# Patient Record
Sex: MALE | Race: WHITE | NOT HISPANIC OR LATINO | URBAN - METROPOLITAN AREA
[De-identification: names, ages, dates, MRNs, and addresses within clinical notes are randomized per-mention and may not be internally consistent; named-entity substitution may affect disease eponyms.]

---

## 2019-08-14 ENCOUNTER — EMERGENCY (EMERGENCY)
Facility: HOSPITAL | Age: 69
LOS: 0 days | Discharge: HOME | End: 2019-08-14
Attending: HOSPITALIST
Payer: MEDICARE

## 2019-08-14 VITALS — TEMPERATURE: 98 F | SYSTOLIC BLOOD PRESSURE: 122 MMHG | HEART RATE: 85 BPM | DIASTOLIC BLOOD PRESSURE: 92 MMHG

## 2019-08-14 VITALS — WEIGHT: 229.94 LBS

## 2019-08-14 DIAGNOSIS — M76.811 ANTERIOR TIBIAL SYNDROME, RIGHT LEG: ICD-10-CM

## 2019-08-14 DIAGNOSIS — M79.671 PAIN IN RIGHT FOOT: ICD-10-CM

## 2019-08-14 PROCEDURE — 99283 EMERGENCY DEPT VISIT LOW MDM: CPT

## 2019-08-14 PROCEDURE — 73620 X-RAY EXAM OF FOOT: CPT | Mod: 26,RT

## 2019-08-14 PROCEDURE — 99284 EMERGENCY DEPT VISIT MOD MDM: CPT | Mod: 25

## 2019-08-14 PROCEDURE — 73620 X-RAY EXAM OF FOOT: CPT | Mod: RT

## 2019-08-14 NOTE — ED ADULT NURSE NOTE - OBJECTIVE STATEMENT
pt c/o sudden unset of right foot pain no injury. no bruise or redness noticed on foot. pt would like to have MRI.

## 2019-08-14 NOTE — ED ADULT NURSE NOTE - CHPI ED NUR SYMPTOMS NEG
no tingling/no fever/no nausea/no chills/no decreased eating/drinking/no dizziness/no vomiting/no weakness

## 2019-08-14 NOTE — ED STATDOCS - PSH
No significant past surgical history Ipledge Number (Optional): [id number] Ipledge Number (Optional): [id number]

## 2019-08-14 NOTE — ED ADULT TRIAGE NOTE - CHIEF COMPLAINT QUOTE
pt c/o right foot pain that started spontaneously 1 week ago. pt states something msut be broken. pt denies fall trauma injury

## 2019-08-14 NOTE — ED STATDOCS - NS_ ATTENDINGSCRIBEDETAILS _ED_A_ED_FT
Yin Redmond MD: The history, relevant review of systems, past medical and surgical history, medical decision making, and physical examination was documented by the scribe in my presence and I attest to the accuracy of the documentation.

## 2019-08-14 NOTE — ED STATDOCS - OBJECTIVE STATEMENT
69 y/o male with no pertinent PMHx presents to the ED c/o right foot pain x2 weeks. Pt says he was walking in [facility] when he suddenly felt a sharp pain. Denies recent injury or trauma. States the pain has been intermittent since then. Pain is exacerbated with movement and palpation. Rides a bicycle often. Pt is here on vacation from [place]. No pain medications taken PTA. Has been told his cholesterol is high but refuses to take medications. NKDA.

## 2019-08-14 NOTE — ED STATDOCS - MUSCULOSKELETAL, MLM
range of motion is not limited +TTP over dorsum of right foot, no limitations to movement, distal pulses 2+, good cap refill

## 2023-07-22 NOTE — ED ADULT NURSE NOTE - NS ED NURSE LEVEL OF CONSCIOUSNESS MENTAL STATUS
Neurology Progress Note    S: Patient seen and examined in ICU. in chair. doing well. no pain     Medication:  acetaminophen     Tablet .. 650 milliGRAM(s) Oral every 6 hours  acetaminophen     Tablet .. 650 milliGRAM(s) Oral every 6 hours PRN  amLODIPine   Tablet 5 milliGRAM(s) Oral daily  ascorbic acid 500 milliGRAM(s) Oral two times a day  aspirin enteric coated 81 milliGRAM(s) Oral daily  atorvastatin 80 milliGRAM(s) Oral at bedtime  bisacodyl Suppository 10 milliGRAM(s) Rectal once  chlorhexidine 2% Cloths 1 Application(s) Topical daily  enoxaparin Injectable 40 milliGRAM(s) SubCutaneous every 24 hours  furosemide    Tablet 20 milliGRAM(s) Oral daily  insulin regular Infusion 3 Unit(s)/Hr IV Continuous <Continuous>  metoclopramide Injectable 10 milliGRAM(s) IV Push every 8 hours  metoprolol tartrate 25 milliGRAM(s) Oral every 8 hours  mupirocin 2% Ointment 1 Application(s) Both Nostrils two times a day  oxyCODONE    IR 5 milliGRAM(s) Oral every 4 hours PRN  oxyCODONE    IR 10 milliGRAM(s) Oral every 4 hours PRN  pantoprazole    Tablet 40 milliGRAM(s) Oral before breakfast  polyethylene glycol 3350 17 Gram(s) Oral daily  senna 2 Tablet(s) Oral at bedtime  sodium chloride 0.9%. 1000 milliLiter(s) IV Continuous <Continuous>      Vitals:  Vital Signs Last 24 Hrs  T(C): 37.3 (22 Jul 2023 04:00), Max: 38 (21 Jul 2023 16:00)  T(F): 99.1 (22 Jul 2023 04:00), Max: 100.4 (21 Jul 2023 16:00)  HR: 85 (22 Jul 2023 07:00) (81 - 93)  BP: 125/72 (22 Jul 2023 07:00) (120/70 - 149/72)  BP(mean): 93 (22 Jul 2023 07:00) (91 - 102)  RR: 16 (22 Jul 2023 07:00) (10 - 26)  SpO2: 100% (22 Jul 2023 07:00) (93% - 100%)    Parameters below as of 22 Jul 2023 04:00  Patient On (Oxygen Delivery Method): nasal cannula  O2 Flow (L/min): 6    General Exam:   General Appearance: Appropriately dressed and in no acute distress       Head: Normocephalic, atraumatic and no dysmorphic features  Ear, Nose, and Throat: Moist mucous membranes  CVS: S1S2+  Resp: No SOB, no wheeze or rhonchi  GI: soft NT/ND  Extremities: No edema or cyanosis  Skin: No bruises or rashes     Neurological Exam:  Mental Status: Awake, alert and oriented x 2.  Able to follow simple verbal commands. Able to name and repeat. fluent speech. No obvious aphasia minimal dysarthria noted.   Cranial Nerves: PERRL, EOMI, VFFC, sensation V1-V3 intact,  no obvious facial asymmetry, equal elevation of palate, scm/trap 5/5, tongue is midline on protrusion. no obvious papilledema on fundoscopic exam. hearing is grossly intact.   Motor: Normal bulk, tone and strength throughout. Fine finger movements were intact and symmetric. no tremors or drift noted.    Sensation: Intact to light touch and pinprick throughout. no right/left confusion. no extinction to tactile on DSS.    Reflexes: 1+ throughout at biceps, brachioradialis, triceps, patellars and ankles bilaterally and equal. No clonus. R toe and L toe were both downgoing.  Coordination: No dysmetria on FNF   Gait: walked on unit yestesrday    I personally reviewed the below data/images/labs:      CBC Full  -  ( 22 Jul 2023 00:56 )  WBC Count : 9.52 K/uL  RBC Count : 3.29 M/uL  Hemoglobin : 8.9 g/dL  Hematocrit : 27.9 %  Platelet Count - Automated : 141 K/uL  Mean Cell Volume : 84.8 fl  Mean Cell Hemoglobin : 27.1 pg  Mean Cell Hemoglobin Concentration : 31.9 gm/dL  Auto Neutrophil # : 6.79 K/uL  Auto Lymphocyte # : 1.81 K/uL  Auto Monocyte # : 0.81 K/uL  Auto Eosinophil # : 0.03 K/uL  Auto Basophil # : 0.03 K/uL  Auto Neutrophil % : 71.4 %  Auto Lymphocyte % : 19.0 %  Auto Monocyte % : 8.5 %  Auto Eosinophil % : 0.3 %  Auto Basophil % : 0.3 %    07-22    142  |  109<H>  |  24<H>  ----------------------------<  108<H>  3.5   |  23  |  0.92    Ca    8.5      22 Jul 2023 00:56  Phos  1.9     07-22  Mg     2.2     07-22    TPro  5.9<L>  /  Alb  3.2<L>  /  TBili  0.9  /  DBili  x   /  AST  44<H>  /  ALT  46<H>  /  AlkPhos  62  07-22    LIVER FUNCTIONS - ( 22 Jul 2023 00:56 )  Alb: 3.2 g/dL / Pro: 5.9 g/dL / ALK PHOS: 62 U/L / ALT: 46 U/L / AST: 44 U/L / GGT: x             Urinalysis Basic - ( 22 Jul 2023 00:56 )    Color: x / Appearance: x / SG: x / pH: x  Gluc: 108 mg/dL / Ketone: x  / Bili: x / Urobili: x   Blood: x / Protein: x / Nitrite: x   Leuk Esterase: x / RBC: x / WBC x   Sq Epi: x / Non Sq Epi: x / Bacteria: x        < from: [facility] Duplex Carotid, Bilat (07.18.23 @ 10:17) >    ACC: [id number] EXAM:  CAROTID DUPLEX BILATERAL   ORDERED BY: MANSI WILEY     PROCEDURE DATE:  07/18/2023          INTERPRETATION:  CLINICAL INFORMATION: Coronary artery disease, evaluate   for carotid artery disease    COMPARISON: None available.    TECHNIQUE: Grayscale, color and spectral Doppler examination of both   carotid arteries was performed.    FINDINGS:    There is increased tortuosity to the right and the left carotid arteries   in the neck.    Mild atheromatous plaque is present along the course of the carotid   arteries in the neck.    No elevated velocities or abnormal waveforms are encountered.    Peak systolic velocities are as follows:    RIGHT:  PROX CCA = 110  DIST CCA = 55  PROX ICA = 71  DIST ICA = 58  ECA = 115    LEFT:  PROX CCA = 95  DIST CCA = 62  PROX ICA = 79  DIST ICA = 69  ECA = 77    Antegrade flow is noted within both vertebral arteries.    IMPRESSION: No significant hemodynamic stenosis of either carotid artery.    Measurement of carotid stenosis is based on velocity parameters that   correlate the residual internal carotid diameter with that of the more   distal vessel in accordance with a method such as the [place] [employer]   Symptomatic Carotid Endarterectomy Trial (NASCET).    --- End of Report ---            ISAIAS CHIU MD; Attending Radiologist  This document has been electronically signed. Jul 18 2023 10:46AM    < end of copied text >         Awake

## 2024-09-20 ENCOUNTER — EMERGENCY (EMERGENCY)
Facility: HOSPITAL | Age: 74
LOS: 0 days | Discharge: HOME | End: 2024-09-20
Attending: STUDENT IN AN ORGANIZED HEALTH CARE EDUCATION/TRAINING PROGRAM
Payer: MEDICARE

## 2024-09-20 VITALS
TEMPERATURE: 98 F | OXYGEN SATURATION: 91 % | RESPIRATION RATE: 18 BRPM | DIASTOLIC BLOOD PRESSURE: 90 MMHG | SYSTOLIC BLOOD PRESSURE: 155 MMHG | HEART RATE: 83 BPM

## 2024-09-20 VITALS
OXYGEN SATURATION: 94 % | WEIGHT: 207.9 LBS | DIASTOLIC BLOOD PRESSURE: 104 MMHG | TEMPERATURE: 98 F | HEIGHT: 70 IN | RESPIRATION RATE: 18 BRPM | HEART RATE: 93 BPM | SYSTOLIC BLOOD PRESSURE: 183 MMHG

## 2024-09-20 DIAGNOSIS — R06.02 SHORTNESS OF BREATH: ICD-10-CM

## 2024-09-20 DIAGNOSIS — Z87.891 PERSONAL HISTORY OF NICOTINE DEPENDENCE: ICD-10-CM

## 2024-09-20 DIAGNOSIS — R09.81 NASAL CONGESTION: ICD-10-CM

## 2024-09-20 DIAGNOSIS — J44.9 CHRONIC OBSTRUCTIVE PULMONARY DISEASE, UNSPECIFIED: ICD-10-CM

## 2024-09-20 LAB
ALBUMIN SERPL ELPH-MCNC: 3.9 G/DL — SIGNIFICANT CHANGE UP (ref 3.3–5)
ALP SERPL-CCNC: 91 U/L — SIGNIFICANT CHANGE UP (ref 40–120)
ALT FLD-CCNC: 24 U/L — SIGNIFICANT CHANGE UP (ref 12–78)
ANION GAP SERPL CALC-SCNC: 7 MMOL/L — SIGNIFICANT CHANGE UP (ref 5–17)
AST SERPL-CCNC: 19 U/L — SIGNIFICANT CHANGE UP (ref 15–37)
BASOPHILS # BLD AUTO: 0.05 K/UL — SIGNIFICANT CHANGE UP (ref 0–0.2)
BASOPHILS NFR BLD AUTO: 0.7 % — SIGNIFICANT CHANGE UP (ref 0–2)
BILIRUB SERPL-MCNC: 0.8 MG/DL — SIGNIFICANT CHANGE UP (ref 0.2–1.2)
BUN SERPL-MCNC: 11 MG/DL — SIGNIFICANT CHANGE UP (ref 7–23)
CALCIUM SERPL-MCNC: 9.5 MG/DL — SIGNIFICANT CHANGE UP (ref 8.5–10.1)
CHLORIDE SERPL-SCNC: 105 MMOL/L — SIGNIFICANT CHANGE UP (ref 96–108)
CO2 SERPL-SCNC: 28 MMOL/L — SIGNIFICANT CHANGE UP (ref 22–31)
CREAT SERPL-MCNC: 0.85 MG/DL — SIGNIFICANT CHANGE UP (ref 0.5–1.3)
EGFR: 91 ML/MIN/1.73M2 — SIGNIFICANT CHANGE UP
EOSINOPHIL # BLD AUTO: 0.3 K/UL — SIGNIFICANT CHANGE UP (ref 0–0.5)
EOSINOPHIL NFR BLD AUTO: 4.3 % — SIGNIFICANT CHANGE UP (ref 0–6)
GLUCOSE SERPL-MCNC: 106 MG/DL — HIGH (ref 70–99)
HCT VFR BLD CALC: 47.8 % — SIGNIFICANT CHANGE UP (ref 39–50)
HGB BLD-MCNC: 15.7 G/DL — SIGNIFICANT CHANGE UP (ref 13–17)
IMM GRANULOCYTES NFR BLD AUTO: 0.3 % — SIGNIFICANT CHANGE UP (ref 0–0.9)
LYMPHOCYTES # BLD AUTO: 2.66 K/UL — SIGNIFICANT CHANGE UP (ref 1–3.3)
LYMPHOCYTES # BLD AUTO: 38.2 % — SIGNIFICANT CHANGE UP (ref 13–44)
MCHC RBC-ENTMCNC: 31.3 PG — SIGNIFICANT CHANGE UP (ref 27–34)
MCHC RBC-ENTMCNC: 32.8 GM/DL — SIGNIFICANT CHANGE UP (ref 32–36)
MCV RBC AUTO: 95.2 FL — SIGNIFICANT CHANGE UP (ref 80–100)
MONOCYTES # BLD AUTO: 0.64 K/UL — SIGNIFICANT CHANGE UP (ref 0–0.9)
MONOCYTES NFR BLD AUTO: 9.2 % — SIGNIFICANT CHANGE UP (ref 2–14)
NEUTROPHILS # BLD AUTO: 3.29 K/UL — SIGNIFICANT CHANGE UP (ref 1.8–7.4)
NEUTROPHILS NFR BLD AUTO: 47.3 % — SIGNIFICANT CHANGE UP (ref 43–77)
PLATELET # BLD AUTO: 217 K/UL — SIGNIFICANT CHANGE UP (ref 150–400)
POTASSIUM SERPL-MCNC: 4 MMOL/L — SIGNIFICANT CHANGE UP (ref 3.5–5.3)
POTASSIUM SERPL-SCNC: 4 MMOL/L — SIGNIFICANT CHANGE UP (ref 3.5–5.3)
PROT SERPL-MCNC: 7.4 GM/DL — SIGNIFICANT CHANGE UP (ref 6–8.3)
RBC # BLD: 5.02 M/UL — SIGNIFICANT CHANGE UP (ref 4.2–5.8)
RBC # FLD: 12.5 % — SIGNIFICANT CHANGE UP (ref 10.3–14.5)
SODIUM SERPL-SCNC: 140 MMOL/L — SIGNIFICANT CHANGE UP (ref 135–145)
TROPONIN I, HIGH SENSITIVITY RESULT: 20.59 NG/L — SIGNIFICANT CHANGE UP
WBC # BLD: 6.96 K/UL — SIGNIFICANT CHANGE UP (ref 3.8–10.5)
WBC # FLD AUTO: 6.96 K/UL — SIGNIFICANT CHANGE UP (ref 3.8–10.5)

## 2024-09-20 PROCEDURE — 85025 COMPLETE CBC W/AUTO DIFF WBC: CPT

## 2024-09-20 PROCEDURE — 71045 X-RAY EXAM CHEST 1 VIEW: CPT

## 2024-09-20 PROCEDURE — 80053 COMPREHEN METABOLIC PANEL: CPT

## 2024-09-20 PROCEDURE — 96374 THER/PROPH/DIAG INJ IV PUSH: CPT

## 2024-09-20 PROCEDURE — 84484 ASSAY OF TROPONIN QUANT: CPT

## 2024-09-20 PROCEDURE — 94640 AIRWAY INHALATION TREATMENT: CPT

## 2024-09-20 PROCEDURE — 36415 COLL VENOUS BLD VENIPUNCTURE: CPT

## 2024-09-20 PROCEDURE — 93005 ELECTROCARDIOGRAM TRACING: CPT

## 2024-09-20 PROCEDURE — 99285 EMERGENCY DEPT VISIT HI MDM: CPT | Mod: 25

## 2024-09-20 PROCEDURE — 99285 EMERGENCY DEPT VISIT HI MDM: CPT

## 2024-09-20 PROCEDURE — 93010 ELECTROCARDIOGRAM REPORT: CPT

## 2024-09-20 PROCEDURE — 71045 X-RAY EXAM CHEST 1 VIEW: CPT | Mod: 26

## 2024-09-20 RX ORDER — IPRATROPIUM BROMIDE AND ALBUTEROL SULFATE .5; 3 MG/3ML; MG/3ML
3 SOLUTION RESPIRATORY (INHALATION) ONCE
Refills: 0 | Status: COMPLETED | OUTPATIENT
Start: 2024-09-20 | End: 2024-09-20

## 2024-09-20 RX ORDER — DEXAMETHASONE 0.75 MG
6 TABLET ORAL ONCE
Refills: 0 | Status: COMPLETED | OUTPATIENT
Start: 2024-09-20 | End: 2024-09-20

## 2024-09-20 RX ADMIN — Medication 2 PUFF(S): at 23:13

## 2024-09-20 RX ADMIN — IPRATROPIUM BROMIDE AND ALBUTEROL SULFATE 3 MILLILITER(S): .5; 3 SOLUTION RESPIRATORY (INHALATION) at 20:52

## 2024-09-20 RX ADMIN — Medication 6 MILLIGRAM(S): at 20:53

## 2024-09-20 NOTE — ED PROVIDER NOTE - PHYSICAL EXAMINATION
GENERAL: A&Ox4, non-toxic appearing, no acute distress  HEENT: NCAT, EOMI, oral mucosa moist, normal conjunctiva  RESP: no respiratory distress, inspiratory and expiratory wheezes on the left, expiratory wheezes on right, speaking in full sentences, SpO2 94% on room air  CV: RRR, no murmurs/rubs/gallops  ABDOMEN: soft, non-tender, non-distended, no guarding, no rebound tenderness  MSK: no visible deformities  NEURO: no focal sensory or motor deficits, CN 2-12 grossly intact  SKIN: warm, normal color, well perfused, no rash  PSYCH: normal affect

## 2024-09-20 NOTE — ED PROVIDER NOTE - OBJECTIVE STATEMENT
73-year-old male PMH COPD not on O2 presents emergency department for shortness of breath.  Patient states he feels short of breath all day with nasal congestion, states is due to allergies.  Patient typically resides in [place].  Patient was evaluated by EMS, found to be hypoxic to 88%.  Patient received nebulizer treatment prior to arrival with improvement.  Patient denies fever, chills, cough, or chest pain.

## 2024-09-20 NOTE — ED PROVIDER NOTE - PROGRESS NOTE DETAILS
Labs personally reviewed and are nonactionable.  Chest x-ray wet read negative for any acute pathology.  Patient reassessed, states he feels significantly better.  Will discharge with albuterol inhaler as needed for respiratory symptoms, patient will follow-up with primary care doctor.  Discussed return precautions and all questions answered. Pt in agreement w/ plan. Patient demonstrates decision making capacity, NAD, VSS. Stable for d/c.

## 2024-09-20 NOTE — ED ADULT TRIAGE NOTE - CHIEF COMPLAINT QUOTE
Pt BIBEMS from urgent care c/o SOB. As per EMS pt was 88% on RA at , he was given Duoneb tx and placed on 12L of O2. Pt BP was also 202/142 at , BP is 183/104 in triage.  Pt satting 94% on RA in triage states he feel much better. Denies any CP, palpitations, or any other complaints at this time. no pmh. occasional smoker.  Pt is awake and alert, no s/s of acute distress this time. Taken for EKG. Pt BIBEMS from urgent care c/o SOB. As per EMS pt was 88% on RA at , he was given Duoneb tx and placed on 12L of O2. Pt BP was also 202/142 at , BP is 183/104 in triage.  Pt satting 94% on RA in triage states he feel much better. Denies any CP, palpitations, or any other complaints at this time. no pmh. occasional smoker.  Pt is awake and alert, no s/s of acute distress at this time. Taken for EKG.

## 2024-09-20 NOTE — ED PROVIDER NOTE - NSFOLLOWUPINSTRUCTIONS_ED_ALL_ED_FT
You are seen in the emergency department for shortness of breath.  Use the albuterol puffer, 2 puffs every 4 hours as needed for shortness of breath.  Follow-up with your primary care doctor in 1-2 weeks.    Rest, drink plenty of fluids.  Advance activity as tolerated.  Continue all previously prescribed medications as directed.  Follow up with your primary care physician in 48-72 hours- bring copies of your results.  Return to the ER for worsening or persistent symptoms, and/or ANY NEW OR CONCERNING SYMPTOMS. If you have issues obtaining follow up, please call: [phone] to obtain a doctor or specialist who takes your insurance in your area.

## 2024-09-20 NOTE — ED ADULT NURSE NOTE - CHIEF COMPLAINT QUOTE
Pt BIBEMS from urgent care c/o SOB. As per EMS pt was 88% on RA at , he was given Duoneb tx and placed on 12L of O2. Pt BP was also 202/142 at , BP is 183/104 in triage.  Pt satting 94% on RA in triage states he feel much better. Denies any CP, palpitations, or any other complaints at this time. no pmh. occasional smoker.  Pt is awake and alert, no s/s of acute distress at this time. Taken for EKG.

## 2024-09-20 NOTE — ED PROVIDER NOTE - CLINICAL SUMMARY MEDICAL DECISION MAKING FREE TEXT BOX
73-year-old male presenting with shortness of breath, bilateral wheezing.  History of COPD, may be exacerbation versus allergy related.  Will evaluate with blood work, chest x-ray, symptomatic treatment with nebs and steroids, anticipate discharge if feeling better.

## 2024-09-20 NOTE — ED PROVIDER NOTE - PATIENT PORTAL LINK FT
You can access the FollowMyHealth Patient Portal offered by [facility] by registering at the following website: [email or web address]. By joining [facility]’s FollowMyHealth portal, you will also be able to view your health information using other applications (apps) compatible with our system.

## 2024-09-20 NOTE — ED ADULT NURSE NOTE - OBJECTIVE STATEMENT
Pt c/o SOB since this afternoon. Pt was seen at , given a nebulizer and reports feeling much better. Pt was told he had to come to the ED d/t RA sat at 88%. Pt says O2 usually runs from 90-94%. Denies fever, cough, CP.

## 2024-09-21 PROBLEM — Z78.9 OTHER SPECIFIED HEALTH STATUS: Chronic | Status: ACTIVE | Noted: 2019-08-29
